# Patient Record
Sex: FEMALE | Race: BLACK OR AFRICAN AMERICAN | URBAN - METROPOLITAN AREA
[De-identification: names, ages, dates, MRNs, and addresses within clinical notes are randomized per-mention and may not be internally consistent; named-entity substitution may affect disease eponyms.]

---

## 2022-01-01 ENCOUNTER — TELEPHONE ENCOUNTER (OUTPATIENT)
Dept: URBAN - METROPOLITAN AREA CLINIC 92 | Facility: CLINIC | Age: 82
End: 2022-01-01

## 2022-01-01 ENCOUNTER — CLAIMS CREATED FROM THE CLAIM WINDOW (OUTPATIENT)
Dept: URBAN - METROPOLITAN AREA MEDICAL CENTER 28 | Facility: MEDICAL CENTER | Age: 82
End: 2022-01-01
Payer: COMMERCIAL

## 2022-01-01 ENCOUNTER — OFFICE VISIT (OUTPATIENT)
Dept: URBAN - METROPOLITAN AREA CLINIC 92 | Facility: CLINIC | Age: 82
End: 2022-01-01
Payer: COMMERCIAL

## 2022-01-01 ENCOUNTER — OFFICE VISIT (OUTPATIENT)
Dept: URBAN - METROPOLITAN AREA CLINIC 92 | Facility: CLINIC | Age: 82
End: 2022-01-01

## 2022-01-01 ENCOUNTER — OFFICE VISIT (OUTPATIENT)
Dept: URBAN - METROPOLITAN AREA MEDICAL CENTER 28 | Facility: MEDICAL CENTER | Age: 82
End: 2022-01-01

## 2022-01-01 ENCOUNTER — DASHBOARD ENCOUNTERS (OUTPATIENT)
Age: 82
End: 2022-01-01

## 2022-01-01 ENCOUNTER — OFFICE VISIT (OUTPATIENT)
Dept: URBAN - METROPOLITAN AREA SURGERY CENTER 16 | Facility: SURGERY CENTER | Age: 82
End: 2022-01-01

## 2022-01-01 ENCOUNTER — WEB ENCOUNTER (OUTPATIENT)
Dept: URBAN - METROPOLITAN AREA CLINIC 92 | Facility: CLINIC | Age: 82
End: 2022-01-01

## 2022-01-01 ENCOUNTER — LAB OUTSIDE AN ENCOUNTER (OUTPATIENT)
Dept: URBAN - METROPOLITAN AREA CLINIC 92 | Facility: CLINIC | Age: 82
End: 2022-01-01

## 2022-01-01 VITALS
SYSTOLIC BLOOD PRESSURE: 102 MMHG | WEIGHT: 111.8 LBS | BODY MASS INDEX: 21.95 KG/M2 | HEART RATE: 111 BPM | DIASTOLIC BLOOD PRESSURE: 65 MMHG | HEIGHT: 60 IN | TEMPERATURE: 97.2 F

## 2022-01-01 VITALS
HEART RATE: 92 BPM | WEIGHT: 110 LBS | HEIGHT: 60 IN | DIASTOLIC BLOOD PRESSURE: 65 MMHG | BODY MASS INDEX: 21.6 KG/M2 | SYSTOLIC BLOOD PRESSURE: 100 MMHG | TEMPERATURE: 97.3 F

## 2022-01-01 VITALS
SYSTOLIC BLOOD PRESSURE: 102 MMHG | HEIGHT: 60 IN | WEIGHT: 112 LBS | TEMPERATURE: 97 F | DIASTOLIC BLOOD PRESSURE: 70 MMHG | HEART RATE: 96 BPM | BODY MASS INDEX: 21.99 KG/M2

## 2022-01-01 DIAGNOSIS — R13.19 CERVICAL DYSPHAGIA: ICD-10-CM

## 2022-01-01 DIAGNOSIS — R63.4 UNINTENTIONAL WEIGHT LOSS: ICD-10-CM

## 2022-01-01 DIAGNOSIS — R13.10 DYSPHAGIA, UNSPECIFIED TYPE: ICD-10-CM

## 2022-01-01 PROCEDURE — 91010 ESOPHAGUS MOTILITY STUDY: CPT | Performed by: INTERNAL MEDICINE

## 2022-01-01 PROCEDURE — 99214 OFFICE O/P EST MOD 30 MIN: CPT | Performed by: INTERNAL MEDICINE

## 2022-01-01 PROCEDURE — 99205 OFFICE O/P NEW HI 60 MIN: CPT | Performed by: INTERNAL MEDICINE

## 2022-01-01 RX ORDER — FAMOTIDINE 20 MG/1
1 TABLET TABLET, FILM COATED ORAL TWICE A DAY
Qty: 180 TABLET | Refills: 3 | Status: ACTIVE | COMMUNITY
Start: 2022-01-01

## 2022-01-01 RX ORDER — FAMOTIDINE 20 MG/1
1 TABLET TABLET, FILM COATED ORAL TWICE A DAY
Qty: 180 TABLET | Refills: 3 | Status: DISCONTINUED | COMMUNITY
Start: 2022-01-01

## 2022-01-01 RX ORDER — POLYETHYLENE GLYCOL 3350, SODIUM SULFATE ANHYDROUS, SODIUM BICARBONATE, SODIUM CHLORIDE, POTASSIUM CHLORIDE 236; 22.74; 6.74; 5.86; 2.97 G/4L; G/4L; G/4L; G/4L; G/4L
AS DIRECTED POWDER, FOR SOLUTION ORAL
Qty: 236 GRAM | Refills: 0 | OUTPATIENT
Start: 2022-01-01 | End: 2022-01-01

## 2022-01-01 RX ORDER — FAMOTIDINE 20 MG/1
1 TABLET TABLET, FILM COATED ORAL TWICE A DAY
Qty: 180 TABLET | Refills: 3 | OUTPATIENT
Start: 2022-01-01

## 2022-03-25 ENCOUNTER — OFFICE VISIT (OUTPATIENT)
Dept: URBAN - METROPOLITAN AREA CLINIC 92 | Facility: CLINIC | Age: 82
End: 2022-03-25
Payer: COMMERCIAL

## 2022-03-25 ENCOUNTER — TELEPHONE ENCOUNTER (OUTPATIENT)
Dept: URBAN - METROPOLITAN AREA CLINIC 92 | Facility: CLINIC | Age: 82
End: 2022-03-25

## 2022-03-25 VITALS
HEIGHT: 60 IN | SYSTOLIC BLOOD PRESSURE: 113 MMHG | HEART RATE: 96 BPM | DIASTOLIC BLOOD PRESSURE: 67 MMHG | WEIGHT: 116 LBS | BODY MASS INDEX: 22.78 KG/M2 | TEMPERATURE: 97.6 F

## 2022-03-25 DIAGNOSIS — R13.10 DYSPHAGIA, UNSPECIFIED TYPE: ICD-10-CM

## 2022-03-25 DIAGNOSIS — Z12.11 SCREENING FOR COLON CANCER: ICD-10-CM

## 2022-03-25 PROCEDURE — 99204 OFFICE O/P NEW MOD 45 MIN: CPT | Performed by: INTERNAL MEDICINE

## 2022-03-25 NOTE — HPI-TODAY'S VISIT:
this is a 81-year-old female who presents for evaluation of dysphagia.    The patient recently moved from [place], [place]. in May 2021, she had an episode of shortness of dyspnea on exertion and shortness of breath.  She had COVID-19 test was negative and was evaluated by pulmonology and underwent CT of the chest that reportedly demonstrated 8 nodules.  She subsequently had a repeat study in 3 months that showed no significant change and was recommended to repeat the test in 6 months.  Because of dysphagia associated with hoarseness, she was evaluated by ENT and underwent laryngoscopy that reportedly demonstrated a closed esophagus.  She was empirically started on Prilosec 40 mg daily without any significant improvement of symptoms.  She had a 2nd opinion by a 2nd ENT who recommended evaluation by GI for EGD.    She reports having continued symptoms of dysphagia and anorexia.  She has had 20 lb unintentional weight loss in the past 6 months.  She reports having regular bowel movements without any hematemesis, melena, or hematochezia.  She has never undergone a screening colonoscopy in the past and previously underwent Cologuard which was negative 3 years ago.

## 2022-04-11 ENCOUNTER — OFFICE VISIT (OUTPATIENT)
Dept: URBAN - METROPOLITAN AREA MEDICAL CENTER 28 | Facility: MEDICAL CENTER | Age: 82
End: 2022-04-11
Payer: COMMERCIAL

## 2022-04-11 DIAGNOSIS — R13.19 CERVICAL DYSPHAGIA: ICD-10-CM

## 2022-04-11 DIAGNOSIS — K22.89 ESOPHAGEAL BLEED, NON-VARICEAL: ICD-10-CM

## 2022-04-11 DIAGNOSIS — K29.60 ADENOPAPILLOMATOSIS GASTRICA: ICD-10-CM

## 2022-04-11 PROCEDURE — 43239 EGD BIOPSY SINGLE/MULTIPLE: CPT | Performed by: INTERNAL MEDICINE

## 2022-04-14 ENCOUNTER — TELEPHONE ENCOUNTER (OUTPATIENT)
Dept: URBAN - METROPOLITAN AREA CLINIC 92 | Facility: CLINIC | Age: 82
End: 2022-04-14

## 2022-04-14 RX ORDER — OMEPRAZOLE 40 MG/1
1 CAPSULE 30 MINUTES BEFORE MORNING MEAL CAPSULE, DELAYED RELEASE ORAL ONCE A DAY
Qty: 90 | Refills: 3 | OUTPATIENT
Start: 2022-04-14

## 2022-04-18 ENCOUNTER — TELEPHONE ENCOUNTER (OUTPATIENT)
Dept: URBAN - METROPOLITAN AREA CLINIC 92 | Facility: CLINIC | Age: 82
End: 2022-04-18

## 2022-05-20 NOTE — HPI-TODAY'S VISIT:
This is a 81-year-old female who presents for a follow-up after an endoscopy.  The patient recently moved from [place], [place]. in May 2021, she had an episode of shortness of dyspnea on exertion and shortness of breath.  She had COVID-19 test was negative and was evaluated by pulmonology and underwent CT of the chest that reportedly demonstrated 8 nodules.  She subsequently had a repeat study in 3 months that showed no significant change and was recommended to repeat the test in 6 months.  Because of dysphagia associated with hoarseness, she was evaluated by ENT and underwent a laryngoscopy that reportedly demonstrated a closed esophagus.  She was empirically started on Prilosec 40 mg daily without any significant improvement of symptoms.  She had a 2nd opinion by a 2nd ENT who recommended evaluation by GI for EGD.    She reports having continued symptoms of dysphagia and anorexia.  She has had a 20 lb unintentional weight loss in the past 6 months.  She underwent EGD on 04/11/2022 that showed a normal esophagus.  Random proximal and distal esophageal biopsies excluded eosinophilic esophagitis.  There was gastritis without H pylori.  There was additional inflammation in the gastric body and biopsies revealed chronic inactive gastritis and hemorrhage in lamina propria without metaplasia, dysplasia, or malignancy.  She was empirically started on Prilosec 40 mg daily which he was not able to tolerate secondary to nausea and loose stools.  She has been transitioned to Pepcid 20 mg twice daily and reports partial improvement of symptoms.  She is pending esophageal manometry on 08/08/2022.  She reports having regular bowel movements without any hematemesis, melena, or hematochezia.  She has never undergone a screening colonoscopy in the past and previously underwent Cologuard which was negative 3 years ago.

## 2022-09-26 NOTE — HPI-TODAY'S VISIT:
This is a 81-year-old female who presents for a follow-up.  The patient recently moved from [place], [place]. in May 2021, she had an episode of shortness of dyspnea on exertion and shortness of breath.  She had a COVID-19 test that was negative and was evaluated by pulmonology and underwent CT of the chest that reportedly demonstrated 8 nodules.  She subsequently had a repeat study in 3 months that showed no significant change and was recommended to repeat the test in 6 months.  Because of dysphagia associated with hoarseness, she was evaluated by ENT and underwent a laryngoscopy that reportedly demonstrated a closed esophagus.  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  She reports having continued symptoms of dysphagia and anorexia.  She has had a 20 lb unintentional weight loss in the past 6 months.  She underwent EGD on 04/11/2022 which showed a normal esophagus.  Random proximal and distal esophageal biopsies excluded eosinophilic esophagitis.  There was gastritis without H pylori.  There was additional inflammation in the gastric body and biopsies revealed chronic inactive gastritis and hemorrhage in lamina propria without metaplasia, dysplasia, or malignancy.  Esophageal manometry on 08/29/2022 revealed mild hypertensive LES with slightly impaired relaxation and normal esophageal peristalsis with excellent transit for both liquid and viscous swallows.  Barium swallow on 09/08/2022 demonstrated unremarkable barium swallow with rapid transit of contrast.  She was amenable to swallowing a 12.5 mm barium tablet.   She was empirically started on Prilosec 40 mg daily which he was not able to tolerate secondary to nausea and loose stools.  She has been transitioned to Pepcid 20 mg twice daily and reports partial improvement of symptoms.    She reports having regular bowel movements without any hematemesis, melena, or hematochezia.  She has never undergone a screening colonoscopy in the past and previously underwent Cologuard which was negative 3 years ago.

## 2022-11-03 NOTE — HPI-TODAY'S VISIT:
This is a 82-year-old female referred for second opinion by my partner Dr. Field for ongoing dysphagia and a copy will be sent to the referring provider.  Upon review of the chart patient has had a work-up of dysphagia and hoarseness by ENT and underwent a laryngoscopy and did have a lesion on the vocal cord monitored every few months.  She had a 20 pound unintentional weight loss due to dysphagia and anorexia over the last several months.  Work-up that Dr. Field did includes an EGD on April 11, 2022 that was essentially normal and biopsies excluded EOE.  Distal esophageal biopsies were negative for reflux changes or any acute inflammation and proximal esophageal biopsies were normal as well. She had chronic inactive gastritis.  Esophageal manometry study on August 29, 2022 showed mild hypertensive LES (LESP- 46mmHg with impaired relaxation with IRP of 26mmHg (normal <20mmHg) with slightly impaired relaxation and normal esophageal peristalsis with excellent transit for both liquid and viscous swallows. 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  Barium swallow on September 8, 2022 demonstrated unremarkable barium swallow with rapid transit of contrast but a pill was not swallowed. No reflux or obstruction to flow of contrast seen at GEJ. Patient was empirically started on Prilosec 40 mg but she could not tolerate it due to nausea and loose stools.  She has been on Pepcid 20 mg with some improvement of symptoms.  She had no issues with her bowels and had a negative Cologuard 3 years ago.  Her past medical history significant for arthritis. Pt says she has trouble swallowing water and food. Water comes up. Pt has no problem chewing food and getting it down. After she swallows sh is ok. Pt saw an ENT- Dr Mckeon. Pt says she has learned what she can eat and has now maintained her weight. Pt says Dr Field- offered her the botox at EGD- she declined.  Pt says her wt is stable.

## 2022-11-11 PROBLEM — 40739000: Status: ACTIVE | Noted: 2022-03-25

## 2024-01-16 NOTE — PHYSICAL EXAM CHEST:
no lesions,  no deformities,  no traumatic injuries,  no significant scars are present,  chest wall non-tender,  no masses present, breathing is unlabored without accessory muscle use, normal breath sounds [FreeTextEntry2] : LT shoulder pain follow up  DOI 6/1/23